# Patient Record
Sex: MALE | Race: WHITE
[De-identification: names, ages, dates, MRNs, and addresses within clinical notes are randomized per-mention and may not be internally consistent; named-entity substitution may affect disease eponyms.]

---

## 2017-12-14 ENCOUNTER — HOSPITAL ENCOUNTER (OUTPATIENT)
Dept: HOSPITAL 53 - M LABDRWCV | Age: 52
End: 2017-12-14
Attending: INTERNAL MEDICINE
Payer: COMMERCIAL

## 2017-12-14 DIAGNOSIS — R93.3: Primary | ICD-10-CM

## 2017-12-14 DIAGNOSIS — R10.13: ICD-10-CM

## 2017-12-14 DIAGNOSIS — R19.7: ICD-10-CM

## 2017-12-14 DIAGNOSIS — R11.2: ICD-10-CM

## 2017-12-14 LAB
ALBUMIN SERPL BCG-MCNC: 3.9 GM/DL (ref 3.2–5.2)
ALBUMIN/GLOB SERPL: 1.22 {RATIO} (ref 1–1.93)
ALP SERPL-CCNC: 89 U/L (ref 45–117)
ALT SERPL W P-5'-P-CCNC: 99 U/L (ref 12–78)
AST SERPL-CCNC: 43 U/L (ref 7–37)
BILIRUB CONJ SERPL-MCNC: 0.3 MG/DL (ref 0–0.2)
BILIRUB SERPL-MCNC: 1.5 MG/DL (ref 0.2–1)
PROT SERPL-MCNC: 7.1 GM/DL (ref 6.4–8.2)

## 2017-12-22 ENCOUNTER — HOSPITAL ENCOUNTER (OUTPATIENT)
Dept: HOSPITAL 53 - M RAD | Age: 52
End: 2017-12-22
Attending: INTERNAL MEDICINE
Payer: COMMERCIAL

## 2017-12-22 DIAGNOSIS — R94.5: ICD-10-CM

## 2017-12-22 DIAGNOSIS — K82.9: ICD-10-CM

## 2017-12-22 DIAGNOSIS — R93.3: ICD-10-CM

## 2017-12-22 DIAGNOSIS — K76.0: ICD-10-CM

## 2017-12-22 DIAGNOSIS — K85.90: Primary | ICD-10-CM

## 2017-12-22 NOTE — REP
MRCP:

 

MRCP exam is accomplished utilizing multiple heavily T2-weighted sequences in the

axial and coronal planes.  MIP reconstruction images are performed.

 

Correlation made with prior CT abdomen and pelvis with contrast 11/02/2017.

 

The gallbladder demonstrates approximately three tiny filling defects along the

inner wall in the range of 2-3 mm in diameter compatible with tiny gallbladder

polyps.  There is no gallbladder wall edema.  There is no intrahepatic biliary

dilatation. The common bile duct is normal in caliber.  Maximum diameter is 4 mm.

There is no stricture or stone in the common bile duct.  The pancreatic duct is

normal in caliber.  The liver, spleen, adrenals and pancreas demonstrate normal

signal.  There is no current evidence of acute pancreatitis.  There is a

subcentimeter cyst of the mid left kidney laterally.  There is no hydronephrosis

bilaterally.  I see no adenopathy or free fluid in the abdomen.

 

IMPRESSION:

 

There appear to be approximately three tiny polyps in the gallbladder.  No

biliary dilatation and no abnormality of the common bile duct.  No current

evidence of pancreatitis.

 

 

Signed by

Dick Pemberton MD 12/22/2017 11:52 A

## 2019-12-10 ENCOUNTER — HOSPITAL ENCOUNTER (OUTPATIENT)
Dept: HOSPITAL 53 - M SFHCCAPE | Age: 54
End: 2019-12-10
Attending: PHYSICIAN ASSISTANT
Payer: COMMERCIAL

## 2019-12-10 DIAGNOSIS — Z13.6: Primary | ICD-10-CM

## 2019-12-10 DIAGNOSIS — Z80.42: ICD-10-CM

## 2019-12-10 LAB
ALBUMIN SERPL BCG-MCNC: 3.8 GM/DL (ref 3.2–5.2)
ALT SERPL W P-5'-P-CCNC: 93 U/L (ref 12–78)
BASOPHILS # BLD AUTO: 0 10^3/UL (ref 0–0.2)
BASOPHILS NFR BLD AUTO: 0.8 % (ref 0–1)
BILIRUB SERPL-MCNC: 1.6 MG/DL (ref 0.2–1)
BUN SERPL-MCNC: 15 MG/DL (ref 7–18)
CALCIUM SERPL-MCNC: 8.6 MG/DL (ref 8.5–10.1)
CHLORIDE SERPL-SCNC: 107 MEQ/L (ref 98–107)
CHOLEST SERPL-MCNC: 195 MG/DL (ref ?–200)
CHOLEST/HDLC SERPL: 4.15 {RATIO} (ref ?–5)
CO2 SERPL-SCNC: 29 MEQ/L (ref 21–32)
CREAT SERPL-MCNC: 1.12 MG/DL (ref 0.7–1.3)
EOSINOPHIL # BLD AUTO: 0.2 10^3/UL (ref 0–0.5)
EOSINOPHIL NFR BLD AUTO: 4.5 % (ref 0–3)
GFR SERPL CREATININE-BSD FRML MDRD: > 60 ML/MIN/{1.73_M2} (ref 56–?)
GLUCOSE SERPL-MCNC: 92 MG/DL (ref 70–100)
HCT VFR BLD AUTO: 47.7 % (ref 42–52)
HDLC SERPL-MCNC: 47 MG/DL (ref 40–?)
HGB BLD-MCNC: 15.8 G/DL (ref 13.5–17.5)
LDLC SERPL CALC-MCNC: 132 MG/DL (ref ?–100)
LYMPHOCYTES # BLD AUTO: 1.7 10^3/UL (ref 1.5–5)
LYMPHOCYTES NFR BLD AUTO: 34.1 % (ref 24–44)
MCH RBC QN AUTO: 31.9 PG (ref 27–33)
MCHC RBC AUTO-ENTMCNC: 33.1 G/DL (ref 32–36.5)
MCV RBC AUTO: 96.4 FL (ref 80–96)
MONOCYTES # BLD AUTO: 0.6 10^3/UL (ref 0–0.8)
MONOCYTES NFR BLD AUTO: 11.2 % (ref 0–5)
NEUTROPHILS # BLD AUTO: 2.4 10^3/UL (ref 1.5–8.5)
NEUTROPHILS NFR BLD AUTO: 49.2 % (ref 36–66)
NONHDLC SERPL-MCNC: 148 MG/DL
PLATELET # BLD AUTO: 226 10^3/UL (ref 150–450)
POTASSIUM SERPL-SCNC: 4.2 MEQ/L (ref 3.5–5.1)
PROT SERPL-MCNC: 6.7 GM/DL (ref 6.4–8.2)
RBC # BLD AUTO: 4.95 10^6/UL (ref 4.3–6.1)
SODIUM SERPL-SCNC: 140 MEQ/L (ref 136–145)
TRIGL SERPL-MCNC: 81 MG/DL (ref ?–150)
TSH SERPL DL<=0.005 MIU/L-ACNC: 1.68 UIU/ML (ref 0.36–3.74)
WBC # BLD AUTO: 4.9 10^3/UL (ref 4–10)

## 2022-01-24 ENCOUNTER — HOSPITAL ENCOUNTER (OUTPATIENT)
Dept: HOSPITAL 53 - M LABDRAWC | Age: 57
End: 2022-01-24
Attending: PHYSICIAN ASSISTANT
Payer: COMMERCIAL

## 2022-01-24 DIAGNOSIS — R94.5: Primary | ICD-10-CM

## 2022-01-24 DIAGNOSIS — R68.81: ICD-10-CM

## 2022-01-24 LAB
ALBUMIN SERPL BCG-MCNC: 3.7 GM/DL (ref 3.2–5.2)
ALT SERPL W P-5'-P-CCNC: 93 U/L (ref 12–78)
APTT BLD: 34.1 SECONDS (ref 25.9–37)
BILIRUB CONJ SERPL-MCNC: 0.3 MG/DL (ref 0–0.2)
BILIRUB SERPL-MCNC: 1.4 MG/DL (ref 0.2–1)
FERRITIN SERPL-MCNC: 171 NG/ML (ref 26–388)
HBV SURFACE AB SER QL: NEGATIVE
HBV SURFACE AB SER-ACNC: NEGATIVE M[IU]/ML
HCV AB SER QL: < 0 INDEX (ref ?–0.8)
INR PPP: 0.96
IRON SATN MFR SERPL: 33.6 % (ref 19.7–50)
IRON SERPL-MCNC: 89 UG/DL (ref 65–175)
PROT SERPL-MCNC: 6.9 GM/DL (ref 6.4–8.2)
PROTHROMBIN TIME: 13.2 SECONDS (ref 12.7–14.5)
TIBC SERPL-MCNC: 265 UG/DL (ref 250–450)
TSH SERPL DL<=0.005 MIU/L-ACNC: 1.84 UIU/ML (ref 0.36–3.74)

## 2023-05-16 ENCOUNTER — HOSPITAL ENCOUNTER (OUTPATIENT)
Dept: HOSPITAL 53 - M ED | Age: 58
Setting detail: OBSERVATION
LOS: 1 days | Discharge: HOME | End: 2023-05-17
Attending: INTERNAL MEDICINE | Admitting: INTERNAL MEDICINE
Payer: COMMERCIAL

## 2023-05-16 VITALS — HEIGHT: 72 IN | WEIGHT: 219.8 LBS | BODY MASS INDEX: 29.77 KG/M2

## 2023-05-16 DIAGNOSIS — Z90.49: ICD-10-CM

## 2023-05-16 DIAGNOSIS — H81.13: Primary | ICD-10-CM

## 2023-05-16 DIAGNOSIS — R26.2: ICD-10-CM

## 2023-05-16 DIAGNOSIS — H93.13: ICD-10-CM

## 2023-05-16 LAB
BASOPHILS # BLD AUTO: 0.1 10^3/UL (ref 0–0.2)
BASOPHILS NFR BLD AUTO: 0.5 % (ref 0–1)
BUN SERPL-MCNC: 15 MG/DL (ref 9–23)
CALCIUM SERPL-MCNC: 9.1 MG/DL (ref 8.5–10.1)
CHLORIDE SERPL-SCNC: 104 MMOL/L (ref 98–107)
CK MB CFR.DF SERPL CALC: 0.9
CK MB SERPL-MCNC: < 1 NG/ML (ref ?–3.6)
CK SERPL-CCNC: 110 U/L (ref 46–171)
CO2 SERPL-SCNC: 27 MMOL/L (ref 20–31)
CREAT SERPL-MCNC: 1.06 MG/DL (ref 0.7–1.3)
EOSINOPHIL # BLD AUTO: 0.1 10^3/UL (ref 0–0.5)
EOSINOPHIL NFR BLD AUTO: 0.6 % (ref 0–3)
GFR SERPL CREATININE-BSD FRML MDRD: > 60 ML/MIN/{1.73_M2} (ref 56–?)
GLUCOSE SERPL-MCNC: 123 MG/DL (ref 60–100)
HCT VFR BLD AUTO: 48.4 % (ref 42–52)
HGB BLD-MCNC: 16.4 G/DL (ref 13.5–17.5)
LYMPHOCYTES # BLD AUTO: 1.5 10^3/UL (ref 1.5–5)
LYMPHOCYTES NFR BLD AUTO: 14.6 % (ref 24–44)
MAGNESIUM SERPL-MCNC: 1.9 MG/DL (ref 1.8–2.4)
MCH RBC QN AUTO: 32 PG (ref 27–33)
MCHC RBC AUTO-ENTMCNC: 33.9 G/DL (ref 32–36.5)
MCV RBC AUTO: 94.3 FL (ref 80–96)
MONOCYTES # BLD AUTO: 0.5 10^3/UL (ref 0–0.8)
MONOCYTES NFR BLD AUTO: 4.4 % (ref 2–8)
NEUTROPHILS # BLD AUTO: 8.4 10^3/UL (ref 1.5–8.5)
NEUTROPHILS NFR BLD AUTO: 79.6 % (ref 36–66)
PLATELET # BLD AUTO: 211 10^3/UL (ref 150–450)
POTASSIUM SERPL-SCNC: 4 MMOL/L (ref 3.5–5.1)
RBC # BLD AUTO: 5.13 10^6/UL (ref 4.3–6.1)
RSV RNA NPH QL NAA+PROBE: NEGATIVE
SODIUM SERPL-SCNC: 139 MMOL/L (ref 136–145)
TSH SERPL DL<=0.005 MIU/L-ACNC: 1.01 UIU/ML (ref 0.55–4.78)
WBC # BLD AUTO: 10.6 10^3/UL (ref 4–10)

## 2023-05-16 PROCEDURE — 96374 THER/PROPH/DIAG INJ IV PUSH: CPT

## 2023-05-16 PROCEDURE — 99284 EMERGENCY DEPT VISIT MOD MDM: CPT

## 2023-05-16 PROCEDURE — 70496 CT ANGIOGRAPHY HEAD: CPT

## 2023-05-16 PROCEDURE — 70551 MRI BRAIN STEM W/O DYE: CPT

## 2023-05-16 PROCEDURE — 82553 CREATINE MB FRACTION: CPT

## 2023-05-16 PROCEDURE — 97161 PT EVAL LOW COMPLEX 20 MIN: CPT

## 2023-05-16 PROCEDURE — 84484 ASSAY OF TROPONIN QUANT: CPT

## 2023-05-16 PROCEDURE — 93005 ELECTROCARDIOGRAM TRACING: CPT

## 2023-05-16 PROCEDURE — 70498 CT ANGIOGRAPHY NECK: CPT

## 2023-05-16 PROCEDURE — 83735 ASSAY OF MAGNESIUM: CPT

## 2023-05-16 PROCEDURE — 82550 ASSAY OF CK (CPK): CPT

## 2023-05-16 PROCEDURE — 85025 COMPLETE CBC W/AUTO DIFF WBC: CPT

## 2023-05-16 PROCEDURE — 80048 BASIC METABOLIC PNL TOTAL CA: CPT

## 2023-05-16 PROCEDURE — 97112 NEUROMUSCULAR REEDUCATION: CPT

## 2023-05-16 PROCEDURE — 84443 ASSAY THYROID STIM HORMONE: CPT

## 2023-05-16 PROCEDURE — 87631 RESP VIRUS 3-5 TARGETS: CPT

## 2023-05-16 PROCEDURE — 97530 THERAPEUTIC ACTIVITIES: CPT

## 2023-05-16 PROCEDURE — 70450 CT HEAD/BRAIN W/O DYE: CPT

## 2023-05-17 VITALS — SYSTOLIC BLOOD PRESSURE: 119 MMHG | DIASTOLIC BLOOD PRESSURE: 64 MMHG
